# Patient Record
Sex: MALE | Race: WHITE
[De-identification: names, ages, dates, MRNs, and addresses within clinical notes are randomized per-mention and may not be internally consistent; named-entity substitution may affect disease eponyms.]

---

## 2017-11-03 ENCOUNTER — HOSPITAL ENCOUNTER (OUTPATIENT)
Dept: HOSPITAL 80 - FIMAGING | Age: 22
End: 2017-11-03
Attending: GENERAL ACUTE CARE HOSPITAL
Payer: COMMERCIAL

## 2017-11-03 DIAGNOSIS — R10.9: Primary | ICD-10-CM

## 2017-11-03 PROCEDURE — 78264 GASTRIC EMPTYING IMG STUDY: CPT

## 2017-11-03 PROCEDURE — A9541 TC99M SULFUR COLLOID: HCPCS

## 2017-11-18 ENCOUNTER — HOSPITAL ENCOUNTER (OUTPATIENT)
Dept: HOSPITAL 80 - FED | Age: 22
Setting detail: OBSERVATION
LOS: 1 days | Discharge: HOME | End: 2017-11-19
Attending: HOSPITALIST | Admitting: FAMILY MEDICINE
Payer: COMMERCIAL

## 2017-11-18 DIAGNOSIS — M35.00: ICD-10-CM

## 2017-11-18 DIAGNOSIS — K58.9: ICD-10-CM

## 2017-11-18 DIAGNOSIS — R11.2: Primary | ICD-10-CM

## 2017-11-18 DIAGNOSIS — E86.0: ICD-10-CM

## 2017-11-18 LAB
% IMMATURE GRANULYOCYTES: 0.5 % (ref 0–1.1)
ABSOLUTE IMMATURE GRANULOCYTES: 0.05 10^3/UL (ref 0–0.1)
ABSOLUTE NRBC COUNT: 0 10^3/UL (ref 0–0.01)
ADD DIFF?: NO
ADD MORPH?: NO
ADD SCAN?: NO
ALBUMIN SERPL-MCNC: 5.4 G/DL (ref 3.5–5)
ALP SERPL-CCNC: 67 IU/L (ref 38–126)
ALT SERPL-CCNC: 40 IU/L (ref 21–72)
ANION GAP SERPL CALC-SCNC: 17 MEQ/L (ref 8–16)
APTT BLD: 25.7 SEC (ref 23–38)
AST SERPL-CCNC: 25 IU/L (ref 17–59)
ATYPICAL LYMPHOCYTE FLAG: 0 (ref 0–99)
BILIRUB SERPL-MCNC: 1.2 MG/DL (ref 0.1–1.4)
BILIRUBIN-CONJUGATED: 0.1 MG/DL (ref 0–0.5)
BILIRUBIN-UNCONJUGATED: 1.1 MG/DL (ref 0–1.1)
CALCIUM SERPL-MCNC: 10.2 MG/DL (ref 8.5–10.4)
CHLORIDE SERPL-SCNC: 102 MEQ/L (ref 97–110)
CO2 SERPL-SCNC: 25 MEQ/L (ref 22–31)
CREAT SERPL-MCNC: 1.1 MG/DL (ref 0.7–1.3)
ERYTHROCYTE [DISTWIDTH] IN BLOOD BY AUTOMATED COUNT: 12.7 % (ref 11.5–15.2)
FRAGMENT RBC FLAG: 0 (ref 0–99)
GFR SERPL CREATININE-BSD FRML MDRD: > 60 ML/MIN/{1.73_M2}
GLUCOSE SERPL-MCNC: 103 MG/DL (ref 70–100)
HCT VFR BLD CALC: 51.5 % (ref 40–51)
HGB BLD-MCNC: 18.6 G/DL (ref 13.7–17.5)
INR PPP: 1.02 (ref 0.83–1.16)
LEFT SHIFT FLG: 0 (ref 0–99)
LIPEMIA HEMOLYSIS FLAG: 90 (ref 0–99)
MCH RBC BLDCO QN: 31.1 PG (ref 27.9–34.1)
MCHC RBC AUTO-ENTMCNC: 36.1 G/DL (ref 32.4–36.7)
MCV RBC AUTO: 86.1 FL (ref 81.5–99.8)
NRBC-AUTO%: 0 % (ref 0–0.2)
PLATELET # BLD: 180 10^3/UL (ref 150–400)
PLATELET CLUMPS FLAG: 10 (ref 0–99)
PMV BLD AUTO: 10.2 FL (ref 8.7–11.7)
POTASSIUM SERPL-SCNC: 3.9 MEQ/L (ref 3.5–5.2)
PROT SERPL-MCNC: 7.8 G/DL (ref 6.3–8.2)
PROTHROMBIN TIME: 13.3 SEC (ref 12–15)
RBC # BLD AUTO: 5.98 10^6/UL (ref 4.4–6.38)
SODIUM SERPL-SCNC: 144 MEQ/L (ref 134–144)

## 2017-11-18 PROCEDURE — G0378 HOSPITAL OBSERVATION PER HR: HCPCS

## 2017-11-18 RX ADMIN — SODIUM CHLORIDE SCH MLS: 900 INJECTION, SOLUTION INTRAVENOUS at 23:33

## 2017-11-18 RX ADMIN — ONDANSETRON PRN MG: 2 SOLUTION INTRAMUSCULAR; INTRAVENOUS at 23:33

## 2017-11-18 NOTE — EDPHY
H & P


Time Seen by Provider: 11/18/17 19:59


HPI/ROS: 





CHIEF COMPLAINT:  Vomiting, nausea





HISTORY OF PRESENT ILLNESS:  Patient is a 22-year-old male who presents 

emergency department not feeling well.  Patient states that he was recently 

diagnosed with Sjogren's syndrome.  He has been on Plaquenil for the past 3 

weeks.  He takes 200 mg twice daily.  On Thursday he saw his physician and was 

increased to 400 mg twice daily.  He has been on increased dosing for the past 

2 days.  Patient states that he has mild abdominal cramping.  He has had 

numerous episodes of nonbloody nausea and vomiting.  No diarrhea.  No fever.  

Mild chills.  No chest pain or shortness of breath.  No dysuria frequency.  

Patient denies recent travel.  No other new medications.





REVIEW OF SYSTEMS:  


My complete review of systems is negative except as mentioned in the HPI.


Past Medical/Surgical History: 





Includes Sjogren's syndrome, IBS





Past surgical history:  Tonsillectomy





Social history:  Patient does not smoke.


Smoking Status: Never smoked


Physical Exam: 





36.5, 146/94, 125, 18, 98% on room air


GENERAL:  No acute distress, alert.


HEENT:  Eyes normal to inspection, normal pharynx, mildly dry mucous membranes.


NECK:  No thyromegaly, no lymphadenopathy, supple.


RESPIRATORY:  Clear to auscultation bilaterally, no rales, rhonchi or wheezing.


CVS:  Regular rate and rhythm, no rubs, murmurs, or gallops.


ABDOMEN:  Soft, nontender, nondistended, no organomegaly.


BACK:  Normal to inspection, no CVA tenderness.


SKIN:  Normal color, no rash, warm, dry.  No pallor.


EXTREMITIES:  No pedal edema, no calf tenderness, no Homans sign or cords, no 

joint swelling.


NEURO/PSYCH:  Alert and oriented x3, normal mood and affect, normal motor 

sensory exam.  No obvious cranial nerve deficit.


Constitutional: 


 Initial Vital Signs











Temperature (C)  36.5 C   11/18/17 19:51


 


Heart Rate  125 H  11/18/17 19:51


 


Respiratory Rate  18   11/18/17 19:51


 


Blood Pressure  146/94 H  11/18/17 19:51


 


O2 Sat (%)  98   11/18/17 19:51








 











O2 Delivery Mode               Room Air














Allergies/Adverse Reactions: 


 





No Known Allergies Allergy (Unverified 11/18/17 19:56)


 








Home Medications: 














 Medication  Instructions  Recorded


 


Hyoscyamine Sulfate  11/18/17


 


Ondansetron Odt [Zofran Odt 4 mg 4 mg PO Q4PRN PRN #7 tab 11/18/17





(*)]  


 


Plaquenil 200 mg (*)  11/18/17














Medical Decision Making


ED Course/Re-evaluation: 





In the emergency department I discussed possible etiologies with the patient.  

I answered all of his questions.  An IV was placed.  Patient was given 2 L of 

normal saline for hydration.  He is given Zofran 4 mg IV.  He is given Toradol 

30 mg IV for discomfort.





The patient's mother requested a flu swab.  I discussed this with the patient.  

The order was placed.





Patient's CBC showed a mildly elevated white count.  Mildly elevated hematocrit 

of 51.  Chemistry panel is unremarkable.  LFTs are normal.  Lipase is normal.





2100:  I rechecked the patient.  He is feeling much better.  Abdomen is soft, 

nontender nondistended.  I discussed his laboratory results.  I answered all 

her questions.  Patient's mother spoke with his rheumatologist Dr. Cruz.  He 

states that he feels his symptoms are secondary to the Plaquenil.  Often times 

patient's have a reaction to this medication per her conversation with him.





Influenza negative





Patient's vital signs improved.





I discussed possible etiologies with the patient and this included Plaquenil.  

I also discussed other causes.  I gave him warnings prior to leaving and 

reasons for return.  He will return if he is worse.  He was given a 

prescription of Zofran and take-home pack it of Zofran.





Prior to discharge patient's heart rate bumped to 114.  He had no new 

complaints.  His abdominal exam was benign.  Because of this the patient was 

given Normal saline.  





On recheck the patient's heart rate was 122.  His temperature was 100 degrees.  

He was given acetaminophen 1 g orally.  Patient was feeling nauseated.  He is 

given Phenergan 12.5 mg IV.  I discussed the case with Dr. Jain from the 

hospitalist service.  The patient will be admitted for further observation.











Differential Diagnosis: 





My differential includes but is not limited to IBS, medication reaction, 

gastritis, viral illness, pancreatitis, cholecystitis, bowel obstruction





- Data Points


Laboratory Results: 


 Laboratory Results





 11/18/17 20:05 





 11/18/17 20:05 





 











  11/18/17 11/18/17 11/18/17





  20:05 20:05 20:05


 


WBC      





    


 


RBC      





    


 


Hgb      





    


 


Hct      





    


 


MCV      





    


 


MCH      





    


 


MCHC      





    


 


RDW      





    


 


Plt Count      





    


 


MPV      





    


 


Neut % (Auto)      





    


 


Lymph % (Auto)      





    


 


Mono % (Auto)      





    


 


Eos % (Auto)      





    


 


Baso % (Auto)      





    


 


Nucleat RBC Rel Count      





    


 


Absolute Neuts (auto)      





    


 


Absolute Lymphs (auto)      





    


 


Absolute Monos (auto)      





    


 


Absolute Eos (auto)      





    


 


Absolute Basos (auto)      





    


 


Absolute Nucleated RBC      





    


 


Immature Gran %      





    


 


Immature Gran #      





    


 


PT      13.3 SEC SEC





     (12.0-15.0) 


 


INR      1.02 





     (0.83-1.16) 


 


APTT      25.7 SEC SEC





     (23.0-38.0) 


 


Sodium    144 mEq/L mEq/L  





    (134-144)  


 


Potassium    3.9 mEq/L mEq/L  





    (3.5-5.2)  


 


Chloride    102 mEq/L mEq/L  





    ()  


 


Carbon Dioxide    25 mEq/l mEq/l  





    (22-31)  


 


Anion Gap    17 mEq/L H mEq/L  





    (8-16)  


 


BUN    16 mg/dL mg/dL  





    (7-23)  


 


Creatinine    1.1 mg/dL mg/dL  





    (0.7-1.3)  


 


Estimated GFR    > 60   





    


 


Glucose    103 mg/dL H mg/dL  





    ()  


 


Calcium    10.2 mg/dL mg/dL  





    (8.5-10.4)  


 


Total Bilirubin    1.2 mg/dL mg/dL  





    (0.1-1.4)  


 


Conjugated Bilirubin    0.1 mg/dL mg/dL  





    (0.0-0.5)  


 


Unconjugated Bilirubin    1.1 mg/dL mg/dL  





    (0.0-1.1)  


 


AST    25 IU/L IU/L  





    (17-59)  


 


ALT    40 IU/L IU/L  





    (21-72)  


 


Alkaline Phosphatase    67 IU/L IU/L  





    ()  


 


Total Protein    7.8 g/dL g/dL  





    (6.3-8.2)  


 


Albumin    5.4 g/dL H g/dL  





    (3.5-5.0)  


 


Lipase    58 IU/L IU/L  





    ()  


 


Nasal Influenza A PCR  NEGATIVE FOR FLU A     





   (NEGATIVE)   


 


Nasal Influenza B PCR  NEGATIVE FOR FLU B     





   (NEGATIVE)   














  11/18/17





  20:05


 


WBC  10.53 10^3/uL H 10^3/uL





   (3.80-9.50) 


 


RBC  5.98 10^6/uL 10^6/uL





   (4.40-6.38) 


 


Hgb  18.6 g/dL H g/dL





   (13.7-17.5) 


 


Hct  51.5 % H %





   (40.0-51.0) 


 


MCV  86.1 fL fL





   (81.5-99.8) 


 


MCH  31.1 pg pg





   (27.9-34.1) 


 


MCHC  36.1 g/dL g/dL





   (32.4-36.7) 


 


RDW  12.7 % %





   (11.5-15.2) 


 


Plt Count  180 10^3/uL 10^3/uL





   (150-400) 


 


MPV  10.2 fL fL





   (8.7-11.7) 


 


Neut % (Auto)  89.1 % H %





   (39.3-74.2) 


 


Lymph % (Auto)  4.7 % L %





   (15.0-45.0) 


 


Mono % (Auto)  4.7 % %





   (4.5-13.0) 


 


Eos % (Auto)  0.6 % %





   (0.6-7.6) 


 


Baso % (Auto)  0.4 % %





   (0.3-1.7) 


 


Nucleat RBC Rel Count  0.0 % %





   (0.0-0.2) 


 


Absolute Neuts (auto)  9.39 10^3/uL H 10^3/uL





   (1.70-6.50) 


 


Absolute Lymphs (auto)  0.49 10^3/uL L 10^3/uL





   (1.00-3.00) 


 


Absolute Monos (auto)  0.50 10^3/uL 10^3/uL





   (0.30-0.80) 


 


Absolute Eos (auto)  0.06 10^3/uL 10^3/uL





   (0.03-0.40) 


 


Absolute Basos (auto)  0.04 10^3/uL 10^3/uL





   (0.02-0.10) 


 


Absolute Nucleated RBC  0.00 10^3/uL 10^3/uL





   (0-0.01) 


 


Immature Gran %  0.5 % %





   (0.0-1.1) 


 


Immature Gran #  0.05 10^3/uL 10^3/uL





   (0.00-0.10) 


 


PT  





  


 


INR  





  


 


APTT  





  


 


Sodium  





  


 


Potassium  





  


 


Chloride  





  


 


Carbon Dioxide  





  


 


Anion Gap  





  


 


BUN  





  


 


Creatinine  





  


 


Estimated GFR  





  


 


Glucose  





  


 


Calcium  





  


 


Total Bilirubin  





  


 


Conjugated Bilirubin  





  


 


Unconjugated Bilirubin  





  


 


AST  





  


 


ALT  





  


 


Alkaline Phosphatase  





  


 


Total Protein  





  


 


Albumin  





  


 


Lipase  





  


 


Nasal Influenza A PCR  





  


 


Nasal Influenza B PCR  





  











Medications Given: 


 








Discontinued Medications





Acetaminophen (Tylenol)  1,000 mg PO EDNOW ONE


   Stop: 11/18/17 22:01


   Last Admin: 11/18/17 22:06 Dose:  1,000 mg


Famotidine (Pepcid)  20 mg IVP EDNOW ONE


   Stop: 11/18/17 20:12


   Last Admin: 11/18/17 20:16 Dose:  20 mg


Sodium Chloride (Ns)  1,000 mls @ 0 mls/hr IV EDNOW ONE; Wide Open


   PRN Reason: Protocol


   Stop: 11/18/17 20:11


   Last Admin: 11/18/17 20:16 Dose:  1,000 mls


Sodium Chloride (Ns)  1,000 mls @ 0 mls/hr IV ONCE ONE


   PRN Reason: Wide Open


   Stop: 11/18/17 20:46


   Last Admin: 11/18/17 20:47 Dose:  1,000 mls


Sodium Chloride (Ns)  1,000 mls @ 0 mls/hr IV EDNOW ONE; Wide Open


   PRN Reason: Protocol


   Stop: 11/18/17 21:28


   Last Admin: 11/18/17 21:30 Dose:  1,000 mls


Ketorolac Tromethamine (Toradol)  30 mg IVP EDNOW ONE


   Stop: 11/18/17 20:11


   Last Admin: 11/18/17 20:16 Dose:  30 mg


Ondansetron HCl (Zofran)  4 mg IVP EDNOW ONE


   Stop: 11/18/17 20:11


   Last Admin: 11/18/17 20:16 Dose:  4 mg


Ondansetron HCl (Zofran Odt 4 Mg Prepack#2)  1 btl TAKEHOME EDNOW ONE


   Stop: 11/18/17 21:08


   Last Admin: 11/18/17 21:31 Dose:  1 btl


Promethazine HCl (Phenergan)  12.5 mg IVP ONCE ONE


   Stop: 11/18/17 22:09


   Last Admin: 11/18/17 22:11 Dose:  12.5 mg








Departure





- Departure


Disposition: [facility]


Clinical Impression: 


Vomiting


Qualifiers:


 Vomiting type: unspecified Vomiting Intractability: non-intractable Nausea 

presence: with nausea Qualified Code(s): R11.2 - Nausea with vomiting, 

unspecified





Condition: Good

## 2017-11-19 VITALS
SYSTOLIC BLOOD PRESSURE: 120 MMHG | TEMPERATURE: 98.7 F | OXYGEN SATURATION: 94 % | DIASTOLIC BLOOD PRESSURE: 69 MMHG | HEART RATE: 78 BPM | RESPIRATION RATE: 16 BRPM

## 2017-11-19 LAB
% IMMATURE GRANULYOCYTES: 0.4 % (ref 0–1.1)
ABSOLUTE IMMATURE GRANULOCYTES: 0.02 10^3/UL (ref 0–0.1)
ABSOLUTE NRBC COUNT: 0 10^3/UL (ref 0–0.01)
ADD DIFF?: NO
ADD MORPH?: NO
ADD SCAN?: NO
ANION GAP SERPL CALC-SCNC: 12 MEQ/L (ref 8–16)
ATYPICAL LYMPHOCYTE FLAG: 0 (ref 0–99)
CALCIUM SERPL-MCNC: 8.3 MG/DL (ref 8.5–10.4)
CHLORIDE SERPL-SCNC: 109 MEQ/L (ref 97–110)
CO2 SERPL-SCNC: 22 MEQ/L (ref 22–31)
CREAT SERPL-MCNC: 1 MG/DL (ref 0.7–1.3)
ERYTHROCYTE [DISTWIDTH] IN BLOOD BY AUTOMATED COUNT: 12.7 % (ref 11.5–15.2)
FRAGMENT RBC FLAG: 0 (ref 0–99)
GFR SERPL CREATININE-BSD FRML MDRD: > 60 ML/MIN/{1.73_M2}
GLUCOSE SERPL-MCNC: 98 MG/DL (ref 70–100)
HCT VFR BLD CALC: 43.1 % (ref 40–51)
HGB BLD-MCNC: 15.2 G/DL (ref 13.7–17.5)
LEFT SHIFT FLG: 0 (ref 0–99)
LIPEMIA HEMOLYSIS FLAG: 90 (ref 0–99)
MCH RBC BLDCO QN: 30.6 PG (ref 27.9–34.1)
MCHC RBC AUTO-ENTMCNC: 35.3 G/DL (ref 32.4–36.7)
MCV RBC AUTO: 86.9 FL (ref 81.5–99.8)
NRBC-AUTO%: 0 % (ref 0–0.2)
PLATELET # BLD: 141 10^3/UL (ref 150–400)
PLATELET CLUMPS FLAG: 0 (ref 0–99)
PMV BLD AUTO: 10.5 FL (ref 8.7–11.7)
POTASSIUM SERPL-SCNC: 4 MEQ/L (ref 3.5–5.2)
RBC # BLD AUTO: 4.96 10^6/UL (ref 4.4–6.38)
SODIUM SERPL-SCNC: 143 MEQ/L (ref 134–144)

## 2017-11-19 RX ADMIN — ACETAMINOPHEN PRN MG: 325 TABLET ORAL at 04:16

## 2017-11-19 RX ADMIN — ONDANSETRON PRN MG: 2 SOLUTION INTRAMUSCULAR; INTRAVENOUS at 14:27

## 2017-11-19 RX ADMIN — ALUMINUM HYDROXIDE, MAGNESIUM HYDROXIDE, DIMETHICONE PRN ML: 200; 200; 20 SUSPENSION ORAL at 20:15

## 2017-11-19 RX ADMIN — ALUMINUM HYDROXIDE, MAGNESIUM HYDROXIDE, DIMETHICONE PRN ML: 200; 200; 20 SUSPENSION ORAL at 14:40

## 2017-11-19 RX ADMIN — ACETAMINOPHEN PRN MG: 325 TABLET ORAL at 15:07

## 2017-11-19 RX ADMIN — SODIUM CHLORIDE SCH MLS: 900 INJECTION, SOLUTION INTRAVENOUS at 11:44

## 2017-11-19 NOTE — GHP
[f 
rep st]



                                                            HISTORY AND PHYSICAL





DATE OF ADMISSION:  11/18/2017



SOURCE:  The patient provides history, appears reliable.  His parents are at 
bedside and supplement history.



CHIEF COMPLAINT:  Nausea, vomiting, abdominal pain.



HISTORY OF PRESENT ILLNESS:  This is a very pleasant 22-year-old gentleman with 
a past medical history significant for irritable bowel syndrome and recently 
diagnosed Sjogren, started on Plaquenil, presents to the emergency department 
today with complaints of nausea and vomiting that started at approximately 2:00 
p.m.  The patient reports that he was otherwise feeling fairly well.  He has no 
known sick contacts.  He had an appetite until about 2:00 p.m., and then 
developed nausea, vomiting, and dry heaving.  The patient has not been able to 
keep anything down.  He denies any hematemesis.  He did have an episode of 
diarrhea that was nonbloody.  The patient states that his abdominal cramping 
was more severe than his normal irritable bowel symptoms.  Cramping pain to the 
mid and lower abdomen diffusely. 



The patient recently had a dosing change in his Plaquenil from 200 twice daily 
to 400 twice daily.  The patient's rheumatologist, Dr. Cruz was called and 
felt that his symptoms could be attributed to the dosage increase.  He 
recommended withholding the medication at this time. 



During the patient's emergency department stay, he developed a fever of 100 
with an increase in the patient's heart rate into the 120s.  The patient 
reports some intermittent palpitations throughout the day, but no chest pain or 
shortness of breath.  The patient denies any rhinorrhea, cough, sore throat, 
rashes, dysuria, or hematuria.  He has had one episode of nonbloody diarrhea, 
as above.



REVIEW OF SYSTEMS:  GENERAL:  Fevers in the emergency department.  No chills.  
Overall, the patient has been having increased fatigue and dry mouth type 
symptoms for the past year.  SKIN:  The patient denies any rashes or sores.  ENT
:  The patient reporting dry mouth.  No sore throat.  No rhinorrhea.  EYES:  
The patient denies any acute changes in vision or ocular pain.  CV:  The 
patient reports intermittent palpitations.  No chest pain.  RESPIRATORY:  The 
patient denies any shortness of breath or cough.  GI:  Symptoms as noted per 
HPI.  :  The patient denies any dysuria or hematuria.  MUSCULOSKELETAL:  The 
patient reports some diffuse myalgias that have since resolved.  No joint pain.
  NEURO:  The patient reports intermittent headache just today.  Denies any 
numbness or tingling.  PSYCH:  The patient denies anxiety or depression. 
Remainder ROS negative except as noted below



ALLERGIES:  No known drug allergies.



HOME MEDICATIONS:  Plaquenil with recent increase from 200 twice daily to 400 
mg p.o. twice daily, hyoscyamine, Zofran, vitamin D, FC-Cidal and Dysbiocide.



PAST MEDICAL HISTORY:  Significant for irritable bowel syndrome with 
intermittent abdominal cramping.  The patient denies any predominance of 
diarrhea or constipation.  Recently diagnosed Sjogren syndrome.



PAST SURGICAL HISTORY:  Significant for tonsillectomy, wisdom tooth removal, 
EGD and colonoscopy.



FAMILY HISTORY:  Significant for sarcoidosis in father.  A paternal cousin with 
diabetes type 1.



SOCIAL HISTORY:  The patient denies any tobacco, drugs, or alcohol use.



CODE STATUS:  Full.



PHYSICAL EXAMINATION:  VITAL SIGNS:  Upon arrival to the emergency department 
today, blood pressure 146/94, heart rate 125, respiratory rate 18, O2 
saturation 98% in room air with a temperature 36.5.  Vitals currently available
:  Blood pressure 130/73, heart rate is 120, respiratory rate 16, O2 saturation 
97% on room air with a temperature 37.8.  GENERAL:  In no acute distress, 
pleasant young adult male who is lying quietly in bed.  He does appear fatigued
, acutely ill, but nontoxic.  HEAD:  Normocephalic, atraumatic.  EYES:  
Extraocular muscles are intact.  Pupils equal, round, reactive to light 
bilaterally and symmetric.  Slightly sluggish to light.  No scleral icterus or 
conjunctival injection.  ENT:  Mucous membranes appear moist.  No oropharyngeal 
erythema or exudates.  NECK:  Supple.  Trachea midline.  CV:  Tachycardic in 
the 120s, without any murmurs, rubs, or gallops appreciated.  RESPIRATORY:  
Lungs clear to auscultation bilaterally.  No wheezes, rales, or rhonchi 
appreciated.  ABDOMEN:  Positive bowel sounds, soft, nontender to palpation.  
No rebound, guarding, or masses appreciated.  :  No Pascual in place.  No 
suprapubic tenderness to palpation.  EXTREMITIES:  No cyanosis, clubbing, or 
edema appreciated.  MUSCULOSKELETAL:  Patient able to sit up independently.  
Moves all extremities with grossly normal strength.  NEURO:  Cranial nerves 
grossly nonfocal.  No facial drooping.  Moves all extremities as above.  PSYCH:
  Patient awake, alert, and oriented x4.  Thought process, content and 
questions are all appropriate.  He does appear a little fatigued, but 
cooperative.



LABORATORY STUDIES:  WBC is 10.53, H and H 18.6 and 51.5, MCV of 86.1, platelet 
count is 180, neutrophil percent is 89.1%.  PT is 13.3, INR 1.02, PTT is 25.7.  
Sodium 144, potassium 3.9, chloride 102, CO2 of 25, anion gap 17, BUN 16, 
creatinine 1.1, GFR greater than 60, glucose 103, calcium 10.2.  Total 
bilirubin 1.2, conjugated 0.1, unconjugated 1.1, ALT is 40, AST 25, alkaline 
phosphatase 67, total protein 7.8, albumin is 5.4, lipase 58.  Flu A and B are 
both negative.



ASSESSMENT AND PLAN:  A pleasant 22-year-old gentleman with a history of 
irritable bowel syndrome, recently diagnosed Sjogren's, now presents with 
symptoms of nausea, vomiting, and fever.

1.  Nausea and vomiting likely related to patient's Plaquenil versus viral 
syndrome.  The patient did develop a fever, which could still be related to his 
Plaquenil.  Discussed with the parents.  Will continue with IV fluid hydration.
  He is status post 3 L in the emergency department.  Heart rate remains in the 
120s.  At this time, after 3 L, the patient reports that he may now feel like 
he has to void, which he was not able to do previously.  The patient's symptoms 
have been improved.  He received two doses of Zofran and an additional dose of 
Phenergan, and now feeling a little sleepy, but reports that his nausea and 
vomiting are finally subsiding.  Will encourage sips and advancement in diet as 
tolerated.

2.  Tachycardia likely related to dehydration and recent fever.  Will monitor 
the patient overnight on telemetry and continue with IV fluid hydration.  The 
patient denies any chest pain and no additional risk factors for cardiovascular 
disease.

3.  Sjogren.  As above, holding Plaquenil.

4.  Irritable bowel syndrome.  The patient may resume his home medications and 
supplements once he is discharged and diet is advanced and tolerated.

5.  Anion gap, mildly increased, likely related to the patient's current 
dehydration status.  IV fluids, as above, and will plan to repeat in the 
morning a BMP.

6.  Polycythemia likely related to some dehydration, but will repeat a CBC in 
the morning to verify.

7.  Fluid, electrolyte, nutrition.  Status post 3 L in the emergency 
department.  Continue 150 mL of normal saline unless the patient is able to 
tolerate large volume p.o. hydration and improved frequency of voiding.  
Electrolyte replacement if needed in the morning.  Nutrition:  Diet regular, 
advance as tolerated.

8.  Prophylaxis.  SCDs.  Holding anticoagulation as patient with low risk, but 
will place on SCDs when in bed.

9.  Code status is full.



DISPOSITION:  The patient is admitted to observation on the medical floor at 
this time.  Anticipate discharge in the morning once the patient is hydrated 
and his heart rate has decreased. 



Plan discussed with patient and family in detail, and they are agreeable to the 
plan.





Job #:  [id number]

MTDD

## 2017-11-19 NOTE — HOSPPROG
Hospitalist Progress Note


Assessment/Plan: 





21 yo M with PMH of IBS, Sjogren's pw N/V





# n/v: likely related to either increase in plaquenil versus viral 

gastroenteritis. Much improved however still having some nausea. Will monitor, 

if able to eat and sxs resolved will likely dc later today versus tomorrow. Abd 

exam benign, LFTs wnl. 


# Sjogren's: recently started on increased dose of plaquenil but possibly led 

to above, rheum doctor stating we should hold for now


# tachycardia/fever: low grade temperature which could be more indicative of 

viral etiology, tachycardia improved though HR still slightly elevated, 

continue IVF for now


# polycythemia/leukocytosis: resolved s/p IVF, suspect hemoconcentrated on 

arrival


# IBS: continue home mgmt, had one loose stool but nothing more significant


# observation status, suspect he will either be able to dc later today or in am


Patient new to my care. Old records reviewed/summarized as above. Care plan 

reviewed with patients mom present at bedside. 


Subjective: no significant overnight events, patient notes feeling slightly 

better


Objective: 


 Vital Signs











Temp Pulse Resp BP Pulse Ox


 


 37.2 C   100   20   103/67   94 


 


 11/19/17 11:29  11/19/17 11:29  11/19/17 11:29  11/19/17 11:29  11/19/17 11:29








 Laboratory Results





 11/19/17 04:33 





 11/19/17 04:33 





 











 11/18/17 11/19/17 11/20/17





 05:59 05:59 05:59


 


Intake Total  3100 


 


Output Total  100 


 


Balance  3000 








 











PT  13.3 SEC (12.0-15.0)   11/18/17  20:05    


 


INR  1.02  (0.83-1.16)   11/18/17  20:05    








awake alert anicteric


op clear


rrr tachycardia


cta b


soft nt nd


no cce


warm dry well perfused


oriented appropriate





ICD10 Worksheet


Patient Problems: 


 Problems











Problem Status Onset


 


Vomiting Acute

## 2017-11-19 NOTE — PDDCSUM
Discharge Summary


Discharge Summary: 


DISCHARGE SUMMARY





FOLLOW-UP ITEMS: 


Follow-up symptom management with primary care provider





DATE OF ADMISSION:  11/18/2017


DATE OF DISCHARGE:  11/19/2017





DISCHARGE DIAGNOSES:


1. Acute nausea and vomiting


2. Chronic Sjogren's Syndrome


3. Chronic irritable bowel syndrome





CONSULTATIONS:


None





PROCEDURES / IMAGING:


None





CHIEF COMPLAINT:


Acute nausea and vomiting





SUBJECTIVE:


Patient is feeling well at time discharge, he is able to tolerate oral solids 

and liquids, his symptoms are managed with as needed Zofran, he is having 

regular bowel movement





PHYSICAL EXAM ON DISCHARGE:


Abdomen is soft nontender nondistended, bowel sounds are present, no masses are 

palpated, heart rhythm is regular, mildly tachycardic, but this decreases with 

calming the patient, oriented x3, no pain, heart rate 78, afebrile overnight, 

satting well on room air, systolic blood pressure 120





LABS ON DISCHARGE:


White blood cell count 4600, hemoglobin 15.2, platelets 126032, potassium 4, 

creatinine 1.0, serum sodium 143, flu PCR negative





HOSPITAL COURSE BY PROBLEM:


The patient presented with acute nausea and vomiting, leading to dehydration 

and hemoconcentration with mild leukocytosis and polycythemia, secondary to 

hypovolemia.  The patient received empiric IV fluids, supportive care, and his 

symptoms resolved.  Is unclear whether his nausea and vomiting are secondary to 

irritable bowel syndrome symptoms, or a mild Plaquenil side effect.  The patient

's Plaquenil was continued, he received supportive care, and he is feeling 

comfortable being discharged with Zofran supportively at this time.  The 

patient is requesting discharge at this hour.  He was continued on his home 

dosage of Levsin for his irritable bowel syndrome.





DISCHARGE MEDICATIONS:


Please see official discharge medication reconciliation sheet in chart , 

continue home medications with the addition of Zofran 4 mg as needed.





DISCHARGE INSTRUCTIONS:


Please follow up with primary care provider this week, to reassess symptoms.

## 2017-11-19 NOTE — ASMTCMCOM
CM Note

 

CM Note                       

Notes:

Chart reviewed admitted with abdominal pain. No needs identified at present. CM available should 

needs arise.

 

Date Signed:  11/19/2017 09:07 AM

Electronically Signed By:Marlene Smith RN

## 2017-11-20 ENCOUNTER — HOSPITAL ENCOUNTER (EMERGENCY)
Dept: HOSPITAL 80 - FED | Age: 22
Discharge: HOME | End: 2017-11-20
Payer: COMMERCIAL

## 2017-11-20 VITALS
DIASTOLIC BLOOD PRESSURE: 76 MMHG | SYSTOLIC BLOOD PRESSURE: 125 MMHG | TEMPERATURE: 97.2 F | HEART RATE: 80 BPM | OXYGEN SATURATION: 98 %

## 2017-11-20 VITALS — RESPIRATION RATE: 16 BRPM

## 2017-11-20 DIAGNOSIS — E86.9: ICD-10-CM

## 2017-11-20 DIAGNOSIS — R11.0: ICD-10-CM

## 2017-11-20 DIAGNOSIS — R19.7: Primary | ICD-10-CM

## 2017-11-20 NOTE — ASDISCHSUM
----------------------------------------------

Discharge Information

----------------------------------------------

Plan Status:Home with No Needs                       Medically Cleared to Leave:11/18/2017

Discharge Date:11/19/2017 09:18 PM                    D/C Disposition:

ADT D/C Disposition:Home, Routine, Self-Care         Projected Discharge Date:11/19/2017 12:00 AM

Transportation at D/C:                               Discharge Delay Reason:

Follow-Up Date:11/19/2017 12:00 AM                   Discharge Slot:

Final Diagnosis:

----------------------------------------------

Placement Information

----------------------------------------------

----------------------------------------------

Patient Contact Information

----------------------------------------------

Contact Name:ALOK                              Relationship:Mother

Address:[address]                                  Home [phone]

                                                     Work Phone:

City:[place]                                         Alternate Phone:

State/Zip Code:[place] [address]                              Email:

----------------------------------------------

Financial Information

----------------------------------------------

Financial Class:HMO and PPO Plans

Primary Plan Desc:[employer] OUT OF STATE PPO                Primary Plan Number:[id number]

Secondary Plan Desc:                                 Secondary Plan Number:

 

 

----------------------------------------------

Assessment Information

----------------------------------------------

----------------------------------------------

LACE

----------------------------------------------

LACE

 

Length of stay for            Answers:  Less than 1 day                       

current admission                                                             

Acuity / Level of Care        Answers:  Was the patient admitted              

                                        to hospital via the                   

                                        emergency                             

                                        department? Yes:                      

Emergency dept visits in      Answers:  0                                     

last 6 months                                                                 

Score: 3

 

Date Signed:  11/19/2017 09:02 AM

Electronically Signed By:Marlene Smith RN

 

 

----------------------------------------------

[facility] CM Progress Note

----------------------------------------------

CM Note

 

CM Note                       

Notes:

Chart reviewed admitted with abdominal pain. No needs identified at present. CM available should 

needs arise.

 

Date Signed:  11/19/2017 09:07 AM

Electronically Signed By:Marlene Smith RN

 

 

----------------------------------------------

Intervention Information

----------------------------------------------

## 2017-11-20 NOTE — EDPHY
H & P


Stated Complaint: D/C'd from [facility] last night;thinks he's still dehydrated


Time Seen by Provider: 11/20/17 15:01


Source: Patient





- Personal History


Current Tetanus Diphtheria and Acellular Pertussis (TDAP): Yes





- Medical/Surgical History


Hx Asthma: No


Hx Chronic Respiratory Disease: No


Hx Diabetes: No


Hx Cardiac Disease: No


Hx Renal Disease: No


Hx Cirrhosis: No


Hx Alcoholism: No


Hx HIV/AIDS: No


Hx Splenectomy or Spleen Trauma: No


Other PMH: sjogrens, tonsillectomy





- Social History


Smoking Status: Never smoked


Constitutional: 


 Initial Vital Signs











Temperature (C)  37.1 C   11/20/17 14:22


 


Heart Rate  108 H  11/20/17 14:22


 


Respiratory Rate  18   11/20/17 14:22


 


Blood Pressure  116/86 H  11/20/17 14:22


 


O2 Sat (%)  93   11/20/17 14:22








 











O2 Delivery Mode               Room Air














Allergies/Adverse Reactions: 


 





No Known Allergies Allergy (Verified 11/20/17 14:25)


 








Home Medications: 














 Medication  Instructions  Recorded


 


Hydroxychloroquine Sulfate 200 mg PO BID 11/18/17





[Plaquenil 200 mg (*)]  


 


Hyoscyamine Sulfate [Levsin, 0.125 mg SL Q4-6PRN PRN 11/18/17





Hyomax-Sl 0.125 mg (*)]  


 


Acetaminophen [Tylenol 325mg (*)] 650 mg PO Q4HRS PRN  tab 11/19/17


 


Herbals/Supplements -Info Only 1 ea PO DAILY 11/19/17


 


Ondansetron Odt [Zofran Odt 4 mg 4 mg PO Q4 PRN #40 tab 11/19/17





(*)]  


 


Immodium  11/20/17


 


Ondansetron Odt [Zofran Odt 4 mg 4 mg PO Q4 PRN #10 tab 11/20/17





(RX)]  














Medical Decision Making


ED Course/Re-evaluation: 





CHIEF COMPLAINT: Nausea, diarrhea, abdominal discomfort.  





HISTORY OF PRESENT ILLNESS: The patient is a 22-year-old male presenting nausea

, abdominal burning, and diarrhea. The patient was diagnosed with Sjogren last 

month. He increased his dose to Plaquenil on Friday (3 days ago) and after 

developed nausea and vomiting. He was hospitalized with severe dehydration and 

discharged home yesterday. This morning his symptoms of nausea, abdominal 

burning, and diarrhea returned.  The patient states he does not feel well and 

believes he is dehydrated. He is able to keep down fluids and has not vomited 

today. 





REVIEW OF SYSTEMS:  





A 10 point review of systems was performed and is negative with the exception 

of the elements mentioned in the history of present illness.





PHYSICAL EXAM:  





HR, BP, O2 Sat, RR.  Temp noted


General Appearance:  Alert, well hydrated, appropriate, and non-toxic appearing.


Eyes:  Pupils equal, round, reactive to light and accommodation, EOMI, no trauma

, no injection.


Ears:  Clear bilaterally, no perforation, normal landmarks


Nose:  Atraumatic, no rhinorrhea, clear.


Throat:  There is no erythema or exudates, no lesions, normal tonsils, mucus 

membranes moist.


Neck:  Supple, 2+ carotid upstroke, nontender, no lymphadenopathy.


Respiratory:  No retractions, no distress, no wheezes, and no accessory muscle 

use.  Lungs are clear to auscultation bilaterally.


Cardiovascular:  Regular rate and rhythm, no murmurs, rubs, or gallops. 

Bilateral carotid, radial, dorsalis pedis, and posterior tibial pulses intact. 

Good capillary refill all extremities.


Gastrointestinal:  Abdomen is soft, nontender, non-distended, no masses, no 

rebound, no guarding, no peritoneal signs.


Musculoskeletal:  Normal active ROM of all extremities, atraumatic.


Neurological:  Alert, appropriate, and interactive.  The patient has normal 

DTRs and non-focal cranial nerves, motor, sensory, and cerebellar exam.


Skin:  No rashes, good turgor, no nodules on palpation.





Past medical history: Sjogren's syndrome.


Past surgical history: Tonsillectomy.


Family history: Noncontributory. 


Social history: Parents at bedside. 





DIFFERENTIAL DIAGNOSIS: The differential diagnosis for the patient's nausea and 

and abdominal discomfort included but was not limited to dehydration, 

gastroenteritis, gastritis, appendicitis, and medication side effect.  





MEDICAL DECISION MAKING:  Patient with newly diagnosed Sjogren's syndrome 

presents with nausea, diarrhea, and abdominal discomfort, no episodes of 

emesis.  The patient has moist mucous membranes and no abdominal discomfort on 

examination. He is able to keep down fluids. IV was established, the patient 

received fluids, Famotidine, and Zofran IV. 





4:00 p.m.: The patient states he is unable to swallow fluids, though he is able 

to drink Ginger Ale. I recommend the patient increase oral fluids at home. I 

will discharge the patient home with Zofran. 





4:20 p.m.: I re-examined the patient. He continues to have abdominal burning. I 

discussed admission with the patient and his parents. The patient would like to 

wait to see if he feels better at this time. I will re-examine him. 





5:00 p.m.: The patient is feeling better and would like to go home. 








- Data Points


Medications Given: 


 








Discontinued Medications





Sodium Chloride (Ns)  1,000 mls @ 0 mls/hr IV EDNOW ONE; Wide Open


   PRN Reason: Protocol


   Stop: 11/20/17 15:13


   Last Admin: 11/20/17 15:26 Dose:  1,000 mls


Famotidine/Sodium Chloride (Pepcid 20 Mg (Premix))  50 mls @ 200 mls/hr IV 

EDNOW ONE


   Stop: 11/20/17 15:26


   Last Admin: 11/20/17 15:28 Dose:  50 mls


Ondansetron HCl (Zofran)  4 mg IVP EDNOW ONE


   Stop: 11/20/17 15:13


   Last Admin: 11/20/17 15:27 Dose:  Not Given








Departure





- Departure


Disposition: Home, Routine, Self-Care


Clinical Impression: 


 Nausea





Diarrhea


Qualifiers:


 Diarrhea type: unspecified type Qualified Code(s): R19.7 - Diarrhea, 

unspecified





Condition: Good


Instructions:  Acute Nausea and Vomiting (ED), Acute Diarrhea (ED)


Additional Instructions: 


Drink plenty of fluids including drinks with electrolytes such as Gatorade. 

Take Zofran as directed for recurrent nausea and vomiting.  Follow up with your 

primary care physician as needed. Return to the Emergency Department with new 

or worsening symptoms. 


Referrals: 


Ofe De Leon MD [Primary Care Provider] - As per Instructions


Prescriptions: 


Ondansetron Odt [Zofran Odt 4 mg (RX)] 4 mg PO Q4 PRN #10 tab


 PRN Reason: Nausea/Vomiting, Use 1st


Report Scribed for: Tato Ness


Report Scribed by: Alexandra Gundersen


Date of Report: 11/20/17


Time of Report: 15:31

## 2019-01-10 ENCOUNTER — HOSPITAL ENCOUNTER (EMERGENCY)
Dept: HOSPITAL 80 - FED | Age: 24
Discharge: HOME | End: 2019-01-10
Payer: COMMERCIAL

## 2019-01-10 VITALS — DIASTOLIC BLOOD PRESSURE: 78 MMHG | SYSTOLIC BLOOD PRESSURE: 125 MMHG

## 2019-01-10 DIAGNOSIS — Z79.899: ICD-10-CM

## 2019-01-10 DIAGNOSIS — R06.02: ICD-10-CM

## 2019-01-10 DIAGNOSIS — R42: Primary | ICD-10-CM

## 2019-01-10 NOTE — EDPHY
H & P


Stated Complaint: lightheaded, sob


Time Seen by Provider: 01/10/19 04:37


HPI/ROS: 





HPI


The patient presents with lightheadedness which is been present for the last 4-

5 weeks though is getting progressively worse and has been particularly severe 

over the last 1 day.  The patient has a history of likely psoriatic arthritis 

and Sjogren's disease for which he receives Remicade injections over the last 7 

months.  Today he had a Remicade injection in [place], and during the injection 

he felt more lightheaded than usual.  Like he might pass out.  He also noticed 

his breathing become shallow.  He received an IV dose of steroids because of 

this.  He went from the infusion center to the Emergency Department where he 

was evaluated with chest x-ray, EKG, troponin, CBC, chemistry, thyroid panel 

which were all normal.  He felt fine and was discharged home.  He has had 

ongoing symptoms throughout the night and has been unable to sleep well.  He 

says he feels lightheaded and short of breath mostly when he is sitting or 

lying flat.  His symptoms are improved when he walks around.  He has been able 

to eat and drink without difficulty.  He does not have any chest pain, nausea 

or vomiting.  He does not have a headache.





 His rheumatologist thought that he could be developing POTS.  His dose of 

prednisone was increased from 8 mg daily to 12 mg daily.  He is also taking 

Prolia





REVIEW OF SYSTEMS


10 systems were reviewed and negative with the exception of the elements 

mentioned in the history of present illness.





PMHx:  Rheumatologic disease, likely psoriatic arthritis





Soc Hx:  Lives in [place], here with his mom


 





PHYSICAL


General Appearance: Alert, no distress


Eyes: Pupils equal and round no pallor or injection


ENT, Mouth: Mucous membranes dry


Respiratory: There are no retractions, lungs are clear to auscultation


Cardiovascular:  Regular rate and rhythm 


Gastrointestinal:  Abdomen is soft and non-tender, no masses, bowel sounds 

normal 


Neurological:  A&O, cranial nerves 2-12 intact, 5/5 strength in upper and lower 

extremities 


Skin:  Warm and dry, no rashes


Musculoskeletal: Neck is supple non tender 


Extremities:  symmetrical, full range of motion 


Psychiatric:  Patient is oriented X 3, there is no agitation 





Source: Patient, Family, Old records


Exam Limitations: No limitations





- Personal History


Current Tetanus Diphtheria and Acellular Pertussis (TDAP): Yes





- Medical/Surgical History


Hx Asthma: No


Hx Chronic Respiratory Disease: No


Hx Diabetes: No


Hx Cardiac Disease: No


Hx Renal Disease: No


Hx Cirrhosis: No


Hx Alcoholism: No


Hx HIV/AIDS: No


Hx Splenectomy or Spleen Trauma: No


Other PMH: sjogrens, tonsillectomy





- Social History


Smoking Status: Never smoked


Constitutional: 


 Initial Vital Signs











Temperature (C)  37.3 C   01/10/19 04:11


 


Heart Rate  66   01/10/19 04:11


 


Respiratory Rate  20   01/10/19 04:11


 


Blood Pressure  144/79 H  01/10/19 04:11


 


O2 Sat (%)  95   01/10/19 04:11











Allergies/Adverse Reactions: 


 





rifaximin [From Xifaxan] Allergy (Verified 01/10/19 04:09)


 








Home Medications: 














 Medication  Instructions  Recorded


 


Hydroxychloroquine Sulfate 200 mg PO BID 11/18/17





[Plaquenil 200 mg (*)]  


 


Hyoscyamine Sulfate [Levsin, 0.125 mg SL Q4-6PRN PRN 11/18/17





Hyomax-Sl 0.125 mg (*)]  


 


Acetaminophen [Tylenol 325mg (*)] 650 mg PO Q4HRS PRN  tab 11/19/17


 


Herbals/Supplements -Info Only 1 ea PO DAILY 11/19/17


 


Ondansetron Odt [Zofran Odt 4 mg 4 mg PO Q4 PRN #40 tab 11/19/17





(*)]  


 


Immodium  11/20/17


 


Ondansetron Odt [Zofran Odt 4 mg 4 mg PO Q4 PRN #10 tab 11/20/17





(RX)]  


 


Prednisone  01/10/19


 


Remicade Inj 100 mg (*)  01/10/19














Medical Decision Making





- Diagnostics


EKG Interpretation: 





EKG:  Complete interpretation has been separately recorded in the [facility] 

archive.  Summary impression:  Normal sinus rhythm





Differential Diagnosis: 





This is a 23-year-old man with rheumatologic disease, currently taking Remicade 

and prednisone who presents with progressive lightheadedness over the last 

several weeks, worse over the last few days and much worse overnight tonight, 

making it difficult for him to sleep.  His symptoms are worse while lying down 

an are accompanied by shortness of breath which he describes as shallow 

breathing.  He denies any palpitations, chest pain.  He has not had any loss of 

consciousness.





Here, his mucous membranes are dry, otherwise he has a normal physical exam and 

normal vital signs.





His EKG was obtained and was normal.





Patient had orthostatics performed which demonstrated no orthostasis.





He was placed on the cardiac monitor and observed for about an hour.  He had no 

events on telemetry and no a arrhythmias.  He still has ongoing symptoms though 

these have mostly improved.





We plan to discharge him with Cardiology follow-up as an outpatient.  I feel he 

is stable to return home though the cause of his lightheadedness is not 

entirely clear at this point.  I explained to his mother that he may need 

neurology follow-up as well. 





Differential diagnosis includes arrhythmia, PACs, orthostasis related to 

dehydration or POTS.





Departure





- Departure


Disposition: Home, Routine, Self-Care


Clinical Impression: 


 Lightheadedness, Shortness of breath





Condition: Good


Instructions:  Lightheadedness (ED)


Additional Instructions: 


Please make sure to drink plenty of fluids throughout the day.  I recommend you 

follow up with the cardiologist listed below for further testing is needed.


Referrals: 


Ofe De Leon MD [Primary Care Provider] - As per Instructions


Chilango Clarke MD [Medical Doctor] - As per Instructions

## 2019-01-11 NOTE — CPEKG
Test Reason : OPEN

Blood Pressure : ***/*** mmHG

Vent. Rate : 067 BPM     Atrial Rate : 067 BPM

   P-R Int : 142 ms          QRS Dur : 086 ms

    QT Int : 390 ms       P-R-T Axes : 018 075 040 degrees

   QTc Int : 412 ms

 

Sinus rhythm

 

Confirmed by Maggie Arevalo ([id number]) on 1/11/2019 5:29:38 AM

 

Referred By:             Confirmed By:Maggie Arevalo